# Patient Record
Sex: FEMALE | Race: AMERICAN INDIAN OR ALASKA NATIVE
[De-identification: names, ages, dates, MRNs, and addresses within clinical notes are randomized per-mention and may not be internally consistent; named-entity substitution may affect disease eponyms.]

---

## 2020-04-19 ENCOUNTER — HOSPITAL ENCOUNTER (EMERGENCY)
Dept: HOSPITAL 5 - ED | Age: 25
Discharge: HOME | End: 2020-04-19
Payer: MEDICAID

## 2020-04-19 VITALS — DIASTOLIC BLOOD PRESSURE: 70 MMHG | SYSTOLIC BLOOD PRESSURE: 116 MMHG

## 2020-04-19 DIAGNOSIS — O23.32: Primary | ICD-10-CM

## 2020-04-19 DIAGNOSIS — O21.0: ICD-10-CM

## 2020-04-19 DIAGNOSIS — Z3A.16: ICD-10-CM

## 2020-04-19 LAB
ALBUMIN SERPL-MCNC: 4.4 G/DL (ref 3.9–5)
ALT SERPL-CCNC: 15 UNITS/L (ref 7–56)
BACTERIA #/AREA URNS HPF: (no result) /HPF
BASOPHILS # (AUTO): 0 K/MM3 (ref 0–0.1)
BASOPHILS NFR BLD AUTO: 0.3 % (ref 0–1.8)
BILIRUB UR QL STRIP: (no result)
BLOOD UR QL VISUAL: (no result)
BUN SERPL-MCNC: 4 MG/DL (ref 7–17)
BUN/CREAT SERPL: 13 %
CALCIUM SERPL-MCNC: 9.2 MG/DL (ref 8.4–10.2)
EOSINOPHIL # BLD AUTO: 0 K/MM3 (ref 0–0.4)
EOSINOPHIL NFR BLD AUTO: 0.2 % (ref 0–4.3)
HCT VFR BLD CALC: 31.5 % (ref 30.3–42.9)
HEMOLYSIS INDEX: 0
HGB BLD-MCNC: 10 GM/DL (ref 10.1–14.3)
LYMPHOCYTES # BLD AUTO: 1.4 K/MM3 (ref 1.2–5.4)
LYMPHOCYTES NFR BLD AUTO: 16.9 % (ref 13.4–35)
MCHC RBC AUTO-ENTMCNC: 32 % (ref 30–34)
MCV RBC AUTO: 70 FL (ref 79–97)
MONOCYTES # (AUTO): 0.4 K/MM3 (ref 0–0.8)
MONOCYTES % (AUTO): 5.3 % (ref 0–7.3)
MUCOUS THREADS #/AREA URNS HPF: (no result) /HPF
PH UR STRIP: 5 [PH] (ref 5–7)
PLATELET # BLD: 248 K/MM3 (ref 140–440)
RBC # BLD AUTO: 4.53 M/MM3 (ref 3.65–5.03)
RBC #/AREA URNS HPF: 11 /HPF (ref 0–6)
UROBILINOGEN UR-MCNC: < 2 MG/DL (ref ?–2)
WBC #/AREA URNS HPF: 44 /HPF (ref 0–6)

## 2020-04-19 PROCEDURE — 96374 THER/PROPH/DIAG INJ IV PUSH: CPT

## 2020-04-19 PROCEDURE — 87086 URINE CULTURE/COLONY COUNT: CPT

## 2020-04-19 PROCEDURE — 36415 COLL VENOUS BLD VENIPUNCTURE: CPT

## 2020-04-19 PROCEDURE — 96361 HYDRATE IV INFUSION ADD-ON: CPT

## 2020-04-19 PROCEDURE — 80053 COMPREHEN METABOLIC PANEL: CPT

## 2020-04-19 PROCEDURE — 81001 URINALYSIS AUTO W/SCOPE: CPT

## 2020-04-19 PROCEDURE — 84703 CHORIONIC GONADOTROPIN ASSAY: CPT

## 2020-04-19 PROCEDURE — 85025 COMPLETE CBC W/AUTO DIFF WBC: CPT

## 2020-04-19 PROCEDURE — 99283 EMERGENCY DEPT VISIT LOW MDM: CPT

## 2020-04-19 NOTE — EMERGENCY DEPARTMENT REPORT
ED Pregnancy HPI





- General


Chief complaint: Abdominal Pain


Stated complaint: ABD pain/vomiting/discharge


Time Seen by Provider: 04/19/20 02:18


Source: patient


Mode of arrival: Ambulatory


Limitations: No Limitations





- History of Present Illness


Initial comments: 





This is a 24-year-old G1, P0 female who presents the ED at approximately 16 

weeks stating last menstrual period was in January 2020 presents today 

complaining of nausea vomiting for the past couple weeks.  Patient states that 

symptoms are getting worse in the past week and she is unable to eat anything in

the past 2 days.  Patient states that she has a OB/GYN appointment coming up and

has not seen an OB yet this pregnancy.  Patient denies vaginal bleeding, fever, 

chills, abdominal or pelvic pain.  She also denies vaginal bleeding





- Related Data


                                  Previous Rx's











 Medication  Instructions  Recorded  Last Taken  Type


 


Metoclopramide [Reglan] 10 mg PO TID #30 tab 04/19/20 Unknown Rx


 


Nitrofurantoin Mono/M-Cryst 100 mg PO Q12HR #14 capsule 04/19/20 Unknown Rx





[Macrobid CAP]    


 


metroNIDAZOLE [Flagyl] 500 mg PO Q12HR #14 tab 04/19/20 Unknown Rx











                                    Allergies











Allergy/AdvReac Type Severity Reaction Status Date / Time


 


No Known Allergies Allergy   Unverified 04/19/20 01:31














ED Review of Systems


ROS: 


Stated complaint: ABD pain/vomiting/discharge


Other details as noted in HPI





Comment: All other systems reviewed and negative





ED Past Medical Hx





- Past Medical History


Previous Medical History?: No





- Surgical History


Past Surgical History?: No





- Social History


Smoking Status: Never Smoker


Substance Use Type: Alcohol





- Medications


Home Medications: 


                                Home Medications











 Medication  Instructions  Recorded  Confirmed  Last Taken  Type


 


Metoclopramide [Reglan] 10 mg PO TID #30 tab 04/19/20  Unknown Rx


 


Nitrofurantoin Mono/M-Cryst 100 mg PO Q12HR #14 capsule 04/19/20  Unknown Rx





[Macrobid CAP]     


 


metroNIDAZOLE [Flagyl] 500 mg PO Q12HR #14 tab 04/19/20  Unknown Rx














ED Physical Exam





- General


Limitations: No Limitations


General appearance: alert, in no apparent distress





- Head


Head exam: Present: atraumatic, normocephalic





- Eye


Eye exam: Present: normal appearance





- ENT


ENT exam: Present: mucous membranes moist





- Neck


Neck exam: Present: normal inspection





- Respiratory


Respiratory exam: Present: normal lung sounds bilaterally.  Absent: respiratory 

distress





- Cardiovascular


Cardiovascular Exam: Present: regular rate, normal rhythm.  Absent: systolic 

murmur, diastolic murmur, rubs, gallop





- GI/Abdominal


GI/Abdominal exam: Present: soft, normal bowel sounds





- Extremities Exam


Extremities exam: Present: normal inspection





- Back Exam


Back exam: Present: normal inspection





- Neurological Exam


Neurological exam: Present: alert, oriented X3





- Psychiatric


Psychiatric exam: Present: normal affect, normal mood





- Skin


Skin exam: Present: warm, dry, intact, normal color.  Absent: rash





ED Course


                                   Vital Signs











  04/19/20 04/19/20





  01:25 04:44


 


Temperature 98.2 F 98.3 F


 


Pulse Rate 94 H 88


 


Respiratory 18 18





Rate  


 


Blood Pressure 113/70 


 


Blood Pressure  116/70





[Left]  


 


O2 Sat by Pulse 99 100





Oximetry  














ED Medical Decision Making





- Lab Data


Result diagrams: 


                                 04/19/20 01:49





                                 04/19/20 01:49








                             Laboratory Last Values











WBC  8.4 K/mm3 (4.5-11.0)   04/19/20  01:49    


 


RBC  4.53 M/mm3 (3.65-5.03)   04/19/20  01:49    


 


Hgb  10.0 gm/dl (10.1-14.3)  L  04/19/20  01:49    


 


Hct  31.5 % (30.3-42.9)   04/19/20  01:49    


 


MCV  70 fl (79-97)  L  04/19/20  01:49    


 


MCH  22 pg (28-32)  L  04/19/20  01:49    


 


MCHC  32 % (30-34)   04/19/20  01:49    


 


RDW  24.1 % (13.2-15.2)  H  04/19/20  01:49    


 


Plt Count  248 K/mm3 (140-440)   04/19/20  01:49    


 


Lymph % (Auto)  16.9 % (13.4-35.0)   04/19/20  01:49    


 


Mono % (Auto)  5.3 % (0.0-7.3)   04/19/20  01:49    


 


Eos % (Auto)  0.2 % (0.0-4.3)   04/19/20  01:49    


 


Baso % (Auto)  0.3 % (0.0-1.8)   04/19/20  01:49    


 


Lymph #  1.4 K/mm3 (1.2-5.4)   04/19/20  01:49    


 


Mono #  0.4 K/mm3 (0.0-0.8)   04/19/20  01:49    


 


Eos #  0.0 K/mm3 (0.0-0.4)   04/19/20  01:49    


 


Baso #  0.0 K/mm3 (0.0-0.1)   04/19/20  01:49    


 


Seg Neutrophils %  77.3 % (40.0-70.0)  H  04/19/20  01:49    


 


Seg Neutrophils #  6.5 K/mm3 (1.8-7.7)   04/19/20  01:49    


 


Sodium  134 mmol/L (137-145)  L  04/19/20  01:49    


 


Potassium  3.5 mmol/L (3.6-5.0)  L  04/19/20  01:49    


 


Chloride  99.8 mmol/L ()   04/19/20  01:49    


 


Carbon Dioxide  21 mmol/L (22-30)  L  04/19/20  01:49    


 


Anion Gap  17 mmol/L  04/19/20  01:49    


 


BUN  4 mg/dL (7-17)  L  04/19/20  01:49    


 


Creatinine  0.3 mg/dL (0.7-1.2)  L  04/19/20  01:49    


 


Estimated GFR  > 60 ml/min  04/19/20  01:49    


 


BUN/Creatinine Ratio  13 %  04/19/20  01:49    


 


Glucose  97 mg/dL ()   04/19/20  01:49    


 


Calcium  9.2 mg/dL (8.4-10.2)   04/19/20  01:49    


 


Total Bilirubin  0.20 mg/dL (0.1-1.2)   04/19/20  01:49    


 


AST  19 units/L (5-40)   04/19/20  01:49    


 


ALT  15 units/L (7-56)   04/19/20  01:49    


 


Alkaline Phosphatase  56 units/L ()   04/19/20  01:49    


 


Total Protein  7.5 g/dL (6.3-8.2)   04/19/20  01:49    


 


Albumin  4.4 g/dL (3.9-5)   04/19/20  01:49    


 


Albumin/Globulin Ratio  1.4 %  04/19/20  01:49    


 


HCG, Qual  Positive  (Negative)   04/19/20  01:49    














- Medical Decision Making





This 24-year-old female currently pregnant presenting with hyperemesis during 

pregnancy.


This is a mild case as patient was not having any vomiting episode in the ED.


2 L of fluid given to patient.  Patient given some antiemetics in the ED.


Discussed with patient to follow-up with OB/GYN.  Referrals given to patient.


Critical care attestation.: 


If time is entered above; I have spent that time in minutes in the direct care 

of this critically ill patient, excluding procedure time.








ED Disposition


Clinical Impression: 


 Nausea and vomiting during pregnancy, Hyperemesis gravidarum, UTI (urinary 

tract infection) during pregnancy





Disposition: DC-01 TO HOME OR SELFCARE


Is pt being admited?: No


Does the pt Need Aspirin: No


Condition: Stable


Instructions:  Morning Sickness (ED), Pregnancy (ED), Hyperemesis Gravidarum 

(ED), Urinary Tract Infection in Women (ED)


Additional Instructions: 


Make sure to follow up with the OB/GYN as discussed.


Take all your medications as you've been prescribed.


If you have any worsening symptoms or develop new symptoms please return to ED 

immediately.


Prescriptions: 


metroNIDAZOLE [Flagyl] 500 mg PO Q12HR #14 tab


Nitrofurantoin Mono/M-Cryst [Macrobid CAP] 100 mg PO Q12HR #14 capsule


Metoclopramide [Reglan] 10 mg PO TID #30 tab


Referrals: 


[facility] [Primary Care Provider] - 3-5 Days


[facility] [Provider Group] - 3-5 Days


[facility] [Provider Group] - 3-5 Days


[facility] [Provider Group] - 3-5 Days


Forms:  Work/School Release Form(ED)


Time of Disposition: 03:25

## 2020-09-24 ENCOUNTER — HOSPITAL ENCOUNTER (OUTPATIENT)
Dept: HOSPITAL 5 - TRG | Age: 25
Discharge: HOME | End: 2020-09-24
Attending: OBSTETRICS & GYNECOLOGY
Payer: MEDICAID

## 2020-09-24 VITALS — SYSTOLIC BLOOD PRESSURE: 123 MMHG | DIASTOLIC BLOOD PRESSURE: 75 MMHG

## 2020-09-24 DIAGNOSIS — R06.02: ICD-10-CM

## 2020-09-24 DIAGNOSIS — O21.9: Primary | ICD-10-CM

## 2020-09-24 DIAGNOSIS — O99.513: ICD-10-CM

## 2020-09-24 DIAGNOSIS — Z3A.33: ICD-10-CM

## 2020-09-24 LAB
ALBUMIN SERPL-MCNC: 3.7 G/DL (ref 3.9–5)
ALT SERPL-CCNC: 12 UNITS/L (ref 7–56)
BILIRUB UR QL STRIP: (no result)
BLOOD UR QL VISUAL: (no result)
BUN SERPL-MCNC: 6 MG/DL (ref 7–17)
BUN/CREAT SERPL: 15 %
CALCIUM SERPL-MCNC: 9.1 MG/DL (ref 8.4–10.2)
HCT VFR BLD CALC: 25.4 % (ref 30.3–42.9)
HEMOLYSIS INDEX: 12
HGB BLD-MCNC: 8.3 GM/DL (ref 10.1–14.3)
MCHC RBC AUTO-ENTMCNC: 33 % (ref 30–34)
MCV RBC AUTO: 68 FL (ref 79–97)
MUCOUS THREADS #/AREA URNS HPF: (no result) /HPF
PH UR STRIP: 6 [PH] (ref 5–7)
PLATELET # BLD: 267 K/MM3 (ref 140–440)
RBC # BLD AUTO: 3.75 M/MM3 (ref 3.65–5.03)
RBC #/AREA URNS HPF: > 182 /HPF (ref 0–6)
UROBILINOGEN UR-MCNC: 2 MG/DL (ref ?–2)
WBC #/AREA URNS HPF: 8 /HPF (ref 0–6)

## 2020-09-24 PROCEDURE — 85027 COMPLETE CBC AUTOMATED: CPT

## 2020-09-24 PROCEDURE — 81001 URINALYSIS AUTO W/SCOPE: CPT

## 2020-09-24 PROCEDURE — 59025 FETAL NON-STRESS TEST: CPT

## 2020-09-24 PROCEDURE — 36415 COLL VENOUS BLD VENIPUNCTURE: CPT

## 2020-09-24 PROCEDURE — 96360 HYDRATION IV INFUSION INIT: CPT

## 2020-09-24 PROCEDURE — 96372 THER/PROPH/DIAG INJ SC/IM: CPT

## 2020-09-24 PROCEDURE — 96375 TX/PRO/DX INJ NEW DRUG ADDON: CPT

## 2020-09-24 PROCEDURE — 96374 THER/PROPH/DIAG INJ IV PUSH: CPT

## 2020-09-24 PROCEDURE — 80053 COMPREHEN METABOLIC PANEL: CPT

## 2020-09-24 PROCEDURE — 96361 HYDRATE IV INFUSION ADD-ON: CPT

## 2020-09-24 RX ADMIN — TERBUTALINE SULFATE PRN MG: 1 INJECTION SUBCUTANEOUS at 02:00

## 2020-09-24 RX ADMIN — TERBUTALINE SULFATE PRN MG: 1 INJECTION SUBCUTANEOUS at 01:30

## 2020-09-24 RX ADMIN — TERBUTALINE SULFATE PRN MG: 1 INJECTION SUBCUTANEOUS at 02:35
